# Patient Record
Sex: MALE | Race: OTHER | HISPANIC OR LATINO | Employment: UNEMPLOYED | URBAN - METROPOLITAN AREA
[De-identification: names, ages, dates, MRNs, and addresses within clinical notes are randomized per-mention and may not be internally consistent; named-entity substitution may affect disease eponyms.]

---

## 2023-12-14 ENCOUNTER — HOSPITAL ENCOUNTER (EMERGENCY)
Facility: MEDICAL CENTER | Age: 28
End: 2023-12-14
Attending: EMERGENCY MEDICINE

## 2023-12-14 VITALS
SYSTOLIC BLOOD PRESSURE: 149 MMHG | TEMPERATURE: 97.2 F | WEIGHT: 216.71 LBS | BODY MASS INDEX: 29.35 KG/M2 | DIASTOLIC BLOOD PRESSURE: 122 MMHG | RESPIRATION RATE: 16 BRPM | OXYGEN SATURATION: 96 % | HEART RATE: 89 BPM | HEIGHT: 72 IN

## 2023-12-14 DIAGNOSIS — M54.2 NECK PAIN: ICD-10-CM

## 2023-12-14 DIAGNOSIS — I10 HYPERTENSION, UNSPECIFIED TYPE: ICD-10-CM

## 2023-12-14 LAB
ALBUMIN SERPL BCP-MCNC: 4.3 G/DL (ref 3.2–4.9)
ALBUMIN/GLOB SERPL: 1.3 G/DL
ALP SERPL-CCNC: 79 U/L (ref 30–99)
ALT SERPL-CCNC: 25 U/L (ref 2–50)
ANION GAP SERPL CALC-SCNC: 10 MMOL/L (ref 7–16)
AST SERPL-CCNC: 15 U/L (ref 12–45)
BASOPHILS # BLD AUTO: 0.8 % (ref 0–1.8)
BASOPHILS # BLD: 0.07 K/UL (ref 0–0.12)
BILIRUB SERPL-MCNC: 0.2 MG/DL (ref 0.1–1.5)
BUN SERPL-MCNC: 11 MG/DL (ref 8–22)
CALCIUM ALBUM COR SERPL-MCNC: 8.9 MG/DL (ref 8.5–10.5)
CALCIUM SERPL-MCNC: 9.1 MG/DL (ref 8.5–10.5)
CHLORIDE SERPL-SCNC: 103 MMOL/L (ref 96–112)
CO2 SERPL-SCNC: 26 MMOL/L (ref 20–33)
CREAT SERPL-MCNC: 0.86 MG/DL (ref 0.5–1.4)
EOSINOPHIL # BLD AUTO: 0.93 K/UL (ref 0–0.51)
EOSINOPHIL NFR BLD: 11 % (ref 0–6.9)
ERYTHROCYTE [DISTWIDTH] IN BLOOD BY AUTOMATED COUNT: 39.5 FL (ref 35.9–50)
GFR SERPLBLD CREATININE-BSD FMLA CKD-EPI: 119 ML/MIN/1.73 M 2
GLOBULIN SER CALC-MCNC: 3.4 G/DL (ref 1.9–3.5)
GLUCOSE SERPL-MCNC: 103 MG/DL (ref 65–99)
HCT VFR BLD AUTO: 45.1 % (ref 42–52)
HGB BLD-MCNC: 16 G/DL (ref 14–18)
IMM GRANULOCYTES # BLD AUTO: 0.01 K/UL (ref 0–0.11)
IMM GRANULOCYTES NFR BLD AUTO: 0.1 % (ref 0–0.9)
LYMPHOCYTES # BLD AUTO: 2.57 K/UL (ref 1–4.8)
LYMPHOCYTES NFR BLD: 30.4 % (ref 22–41)
MCH RBC QN AUTO: 31 PG (ref 27–33)
MCHC RBC AUTO-ENTMCNC: 35.5 G/DL (ref 32.3–36.5)
MCV RBC AUTO: 87.4 FL (ref 81.4–97.8)
MONOCYTES # BLD AUTO: 0.72 K/UL (ref 0–0.85)
MONOCYTES NFR BLD AUTO: 8.5 % (ref 0–13.4)
NEUTROPHILS # BLD AUTO: 4.15 K/UL (ref 1.82–7.42)
NEUTROPHILS NFR BLD: 49.2 % (ref 44–72)
NRBC # BLD AUTO: 0 K/UL
NRBC BLD-RTO: 0 /100 WBC (ref 0–0.2)
PLATELET # BLD AUTO: 324 K/UL (ref 164–446)
PMV BLD AUTO: 9.1 FL (ref 9–12.9)
POTASSIUM SERPL-SCNC: 4.3 MMOL/L (ref 3.6–5.5)
PROT SERPL-MCNC: 7.7 G/DL (ref 6–8.2)
RBC # BLD AUTO: 5.16 M/UL (ref 4.7–6.1)
SODIUM SERPL-SCNC: 139 MMOL/L (ref 135–145)
WBC # BLD AUTO: 8.5 K/UL (ref 4.8–10.8)

## 2023-12-14 PROCEDURE — 96374 THER/PROPH/DIAG INJ IV PUSH: CPT

## 2023-12-14 PROCEDURE — 36415 COLL VENOUS BLD VENIPUNCTURE: CPT

## 2023-12-14 PROCEDURE — 700102 HCHG RX REV CODE 250 W/ 637 OVERRIDE(OP): Performed by: EMERGENCY MEDICINE

## 2023-12-14 PROCEDURE — 80053 COMPREHEN METABOLIC PANEL: CPT

## 2023-12-14 PROCEDURE — A9270 NON-COVERED ITEM OR SERVICE: HCPCS | Performed by: EMERGENCY MEDICINE

## 2023-12-14 PROCEDURE — 99284 EMERGENCY DEPT VISIT MOD MDM: CPT

## 2023-12-14 PROCEDURE — 700111 HCHG RX REV CODE 636 W/ 250 OVERRIDE (IP): Performed by: EMERGENCY MEDICINE

## 2023-12-14 PROCEDURE — 85025 COMPLETE CBC W/AUTO DIFF WBC: CPT

## 2023-12-14 PROCEDURE — 700105 HCHG RX REV CODE 258: Performed by: EMERGENCY MEDICINE

## 2023-12-14 RX ORDER — KETOROLAC TROMETHAMINE 30 MG/ML
15 INJECTION, SOLUTION INTRAMUSCULAR; INTRAVENOUS ONCE
Status: COMPLETED | OUTPATIENT
Start: 2023-12-14 | End: 2023-12-14

## 2023-12-14 RX ORDER — SODIUM CHLORIDE 9 MG/ML
1000 INJECTION, SOLUTION INTRAVENOUS ONCE
Status: COMPLETED | OUTPATIENT
Start: 2023-12-14 | End: 2023-12-14

## 2023-12-14 RX ORDER — NAPROXEN 500 MG/1
500 TABLET ORAL 2 TIMES DAILY WITH MEALS
Qty: 30 TABLET | Refills: 0 | Status: SHIPPED | OUTPATIENT
Start: 2023-12-14

## 2023-12-14 RX ORDER — CYCLOBENZAPRINE HCL 5 MG
5-10 TABLET ORAL 3 TIMES DAILY PRN
Qty: 30 TABLET | Refills: 0 | Status: SHIPPED | OUTPATIENT
Start: 2023-12-14

## 2023-12-14 RX ORDER — DIAZEPAM 5 MG/1
5 TABLET ORAL ONCE
Status: COMPLETED | OUTPATIENT
Start: 2023-12-14 | End: 2023-12-14

## 2023-12-14 RX ADMIN — KETOROLAC TROMETHAMINE 15 MG: 30 INJECTION, SOLUTION INTRAMUSCULAR; INTRAVENOUS at 17:58

## 2023-12-14 RX ADMIN — SODIUM CHLORIDE 1000 ML: 9 INJECTION, SOLUTION INTRAVENOUS at 17:58

## 2023-12-14 RX ADMIN — DIAZEPAM 5 MG: 5 TABLET ORAL at 18:02

## 2023-12-14 ASSESSMENT — PAIN DESCRIPTION - PAIN TYPE: TYPE: ACUTE PAIN

## 2023-12-14 NOTE — ED TRIAGE NOTES
Chief Complaint   Patient presents with    Neck Pain     L sided, ongoing x4 days and increasing in severity. Pt reports pain now radiates to back and L side of face. 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      BP (!) 175/99   Pulse (!) 110   Temp 36.1 °C (97 °F) (Temporal)   Resp 16   Ht 1.829 m (6')   Wt 98.3 kg (216 lb 11.4 oz)   SpO2 97%   BMI 29.39 kg/m²     Pt ambulatory to triage for above.

## 2023-12-15 NOTE — DISCHARGE INSTRUCTIONS
You were seen in the ER for neck pain.  I suspect that it is due to a muscle strain as we discussed in the ER.  I have called in a prescription for anti-inflammatories as well as muscle relaxers.  Please do not drink alcohol or drive after taking the muscle relaxers as they can make you sleepy.  It will be very important that you return to the ER if your symptoms worsen but if you start to feel better I would like you to establish with a primary care physician especially because your blood pressure was elevated today.  Remember to come back immediately to the ER with new or worsening symptoms.  I hope you feel better soon!

## 2023-12-15 NOTE — ED PROVIDER NOTES
ED Provider Note    CHIEF COMPLAINT  Chief Complaint   Patient presents with    Neck Pain     L sided, ongoing x4 days and increasing in severity. Pt reports pain now radiates to back and L side of face. 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        EXTERNAL RECORDS REVIEWED  Other none available    HPI/ROS  LIMITATION TO HISTORY   Select: Language [place],  Used   OUTSIDE HISTORIAN(S):  None    Yo Maravilla is a 28 y.o. male who presents with a chief complaint of neck pain, back pain, and left-sided face pain with associated headache that started on Monday of this week.  Patient denies any inciting incident.  He denies any recent heavy lifting or twisting, has not had any trauma to the area.  The pain starts right around his left shoulder blade and radiates up to the left side of his neck and then around to his face in the periorbital region.  He has some photophobia from the left eye but denies any vision changes.  He has had some associated nausea and vomited this morning.  He has trialed ibuprofen and massage without significant improvement.  He denies any weakness or numbness in his extremities.  He has not had any difficulty speaking or swallowing.  He denies any IV drug use, fevers or chills, chest pain or shortness of breath.    PAST MEDICAL HISTORY       SURGICAL HISTORY  patient denies any surgical history    FAMILY HISTORY  History reviewed. No pertinent family history.    SOCIAL HISTORY  Social History     Tobacco Use    Smoking status: Never    Smokeless tobacco: Never   Substance and Sexual Activity    Alcohol use: Yes     Comment: occ    Drug use: Never    Sexual activity: Not on file       CURRENT MEDICATIONS  Home Medications       Reviewed by Kelle Lynn R.N. (Registered Nurse) on 12/14/23 at 1556  Med List Status: Partial     Medication Last Dose Status        Patient Jose Taking any Medications                            ALLERGIES  Allergies   Allergen Reactions    Other Drug Vomiting     Pt reports allergy to Dipirona       PHYSICAL EXAM  VITAL SIGNS: BP (!) 175/99   Pulse (!) 110   Temp 36.1 °C (97 °F) (Temporal)   Resp 16   Ht 1.829 m (6')   Wt 98.3 kg (216 lb 11.4 oz)   SpO2 97%   BMI 29.39 kg/m²    Physical Exam  Vitals and nursing note reviewed.   Constitutional:       Appearance: Normal appearance.   HENT:      Head: Normocephalic and atraumatic.      Right Ear: External ear normal.      Left Ear: External ear normal.      Nose: Nose normal.      Mouth/Throat:      Mouth: Mucous membranes are moist.      Pharynx: Oropharynx is clear.   Eyes:      General:         Right eye: No discharge.         Left eye: No discharge.      Extraocular Movements: Extraocular movements intact.      Conjunctiva/sclera: Conjunctivae normal.      Pupils: Pupils are equal, round, and reactive to light.   Neck:      Comments: Slightly decreased range of motion to the left.  Cardiovascular:      Rate and Rhythm: Normal rate and regular rhythm.      Pulses: Normal pulses.   Pulmonary:      Effort: Pulmonary effort is normal.   Abdominal:      Palpations: Abdomen is soft.      Tenderness: There is no abdominal tenderness.   Musculoskeletal:         General: Normal range of motion.      Comments: Tender to palpation over the left paraspinal cervical thoracic musculature overlying erythema, crepitus, fluctuance.  There is no rash or vesicles.   Skin:     General: Skin is warm and dry.   Neurological:      General: No focal deficit present.      Mental Status: He is alert and oriented to person, place, and time.   Psychiatric:         Mood and Affect: Mood normal.         Behavior: Behavior normal.       DIAGNOSTIC STUDIES / PROCEDURES  LABS  Results for orders placed or performed during the hospital encounter of 12/14/23   CBC WITH DIFFERENTIAL   Result Value Ref Range    WBC 8.5 4.8 - 10.8 K/uL    RBC 5.16 4.70 - 6.10 M/uL    Hemoglobin  16.0 14.0 - 18.0 g/dL    Hematocrit 45.1 42.0 - 52.0 %    MCV 87.4 81.4 - 97.8 fL    MCH 31.0 27.0 - 33.0 pg    MCHC 35.5 32.3 - 36.5 g/dL    RDW 39.5 35.9 - 50.0 fL    Platelet Count 324 164 - 446 K/uL    MPV 9.1 9.0 - 12.9 fL    Neutrophils-Polys 49.20 44.00 - 72.00 %    Lymphocytes 30.40 22.00 - 41.00 %    Monocytes 8.50 0.00 - 13.40 %    Eosinophils 11.00 (H) 0.00 - 6.90 %    Basophils 0.80 0.00 - 1.80 %    Immature Granulocytes 0.10 0.00 - 0.90 %    Nucleated RBC 0.00 0.00 - 0.20 /100 WBC    Neutrophils (Absolute) 4.15 1.82 - 7.42 K/uL    Lymphs (Absolute) 2.57 1.00 - 4.80 K/uL    Monos (Absolute) 0.72 0.00 - 0.85 K/uL    Eos (Absolute) 0.93 (H) 0.00 - 0.51 K/uL    Baso (Absolute) 0.07 0.00 - 0.12 K/uL    Immature Granulocytes (abs) 0.01 0.00 - 0.11 K/uL    NRBC (Absolute) 0.00 K/uL   Comp Metabolic Panel   Result Value Ref Range    Sodium 139 135 - 145 mmol/L    Potassium 4.3 3.6 - 5.5 mmol/L    Chloride 103 96 - 112 mmol/L    Co2 26 20 - 33 mmol/L    Anion Gap 10.0 7.0 - 16.0    Glucose 103 (H) 65 - 99 mg/dL    Bun 11 8 - 22 mg/dL    Creatinine 0.86 0.50 - 1.40 mg/dL    Calcium 9.1 8.5 - 10.5 mg/dL    Correct Calcium 8.9 8.5 - 10.5 mg/dL    AST(SGOT) 15 12 - 45 U/L    ALT(SGPT) 25 2 - 50 U/L    Alkaline Phosphatase 79 30 - 99 U/L    Total Bilirubin 0.2 0.1 - 1.5 mg/dL    Albumin 4.3 3.2 - 4.9 g/dL    Total Protein 7.7 6.0 - 8.2 g/dL    Globulin 3.4 1.9 - 3.5 g/dL    A-G Ratio 1.3 g/dL   ESTIMATED GFR   Result Value Ref Range    GFR (CKD-EPI) 119 >60 mL/min/1.73 m 2     RADIOLOGY  Radiologist interpretation:   No orders to display     COURSE & MEDICAL DECISION MAKING    ED Observation Status? No; Patient does not meet criteria for ED Observation.     INITIAL ASSESSMENT, COURSE AND PLAN  Care Narrative: This is a 28-year-old male who is here with neck and back pain that has been ongoing for the past 4 days and is worse with movement.  He also reports a left-sided headache but denies any numbness or weakness in  his extremities or other neurologic symptoms.    Differential diagnosis includes, but is not limited to, muscle strain, fracture, osteomyelitis, discitis, migraine headache, vertebral/carotid artery dissection, muscle spasm.    Arrive hypertensive and tachycardic with otherwise normal vital signs.  Appears slightly dehydrated but nontoxic.  He has decreased range of motion in the neck with pain when ranging the neck to the left but otherwise his neck is supple without meningeal signs.  He has no cervical spine tenderness but he does have cervical paraspinal musculature tenderness as well as tenderness over the left trapezius musculature and down under the left scapula but there is no bony tenderness.  He is able to range the left shoulder without difficulty.  I have a low suspicion for septic arthritis.  There is no overlying erythema, crepitus, fluctuance, pain out of proportion to exam to suggest cellulitis, abscess, necrotizing soft tissue infection.    Patient was given a dose of Valium as well as Toradol and some IV fluids for evidence of dehydration with tacky mucous membranes.    Labs are reassuring without leukocytosis.  Low suspicion that this represents an infectious etiology especially without fever.  Metabolic panel is normal across-the-board with the exception of blood glucose to 103.    Patient was reevaluated at bedside.  He notes significant improvement in his symptoms although when ranging the neck to the left he still has some discomfort in the left trapezius musculature.  My suspicion is that this is a muscle strain.  He has a normal and nonfocal neurologic exam.  I do not think that this represents etiology such as vertebral or carotid artery dissection.  Regarding his headache, there was no thunderclap onset and this is not the worst headache of his life.  I think that this is unlikely to represent SAH.  Will be for muscle relaxers and anti-inflammatories as well as ice and/or heat as is  helpful.  We discussed the importance of following up as an outpatient.  I have asked him to establish with a primary care physician.  He was discharged in good and stable condition with strict return precautions.    HYDRATION: Based on the patient's presentation of Dehydration the patient was given IV fluids. IV Hydration was used because oral hydration was not adequate alone. Upon recheck following hydration, the patient was improved.  HTN/IDDM FOLLOW UP:  The patient is referred to a primary physician for blood pressure management, diabetic screening, and for all other preventive health concerns    ADDITIONAL PROBLEM LIST  None  DISPOSITION AND DISCUSSIONS  I have discussed management of the patient with the following physicians and BETTINA's: None    Discussion of management with other QHP or appropriate source(s): None     Escalation of care considered, and ultimately not performed:diagnostic imaging and acute inpatient care management, however at this time, the patient is most appropriate for outpatient management    Barriers to care at this time, including but not limited to: Patient does not have established PCP.     Decision tools and prescription drugs considered including, but not limited to:  Muscle relaxers and anti-inflammatories .    FINAL DIAGNOSIS  1. Neck pain    2. Hypertension, unspecified type      Electronically signed by: Jonnathan Hirsch M.D., 12/14/2023 5:17 PM

## 2024-09-23 ENCOUNTER — HOSPITAL ENCOUNTER (OUTPATIENT)
Facility: MEDICAL CENTER | Age: 29
End: 2024-09-23
Attending: PATHOLOGY
Payer: COMMERCIAL

## 2024-09-23 LAB
INR PPP: 1.05 (ref 0.87–1.13)
PROTHROMBIN TIME: 13.7 SEC (ref 12–14.6)

## 2024-09-24 ENCOUNTER — HOSPITAL ENCOUNTER (OUTPATIENT)
Facility: MEDICAL CENTER | Age: 29
End: 2024-09-24
Attending: PATHOLOGY
Payer: COMMERCIAL

## 2024-09-24 LAB — APTT PPP: 28.1 SEC (ref 24.7–36)
